# Patient Record
Sex: FEMALE | Race: OTHER
[De-identification: names, ages, dates, MRNs, and addresses within clinical notes are randomized per-mention and may not be internally consistent; named-entity substitution may affect disease eponyms.]

---

## 2018-02-12 ENCOUNTER — HOSPITAL ENCOUNTER (EMERGENCY)
Dept: HOSPITAL 74 - JER | Age: 38
Discharge: HOME | End: 2018-02-12
Payer: COMMERCIAL

## 2018-02-12 VITALS — HEART RATE: 72 BPM | SYSTOLIC BLOOD PRESSURE: 131 MMHG | TEMPERATURE: 98.4 F | DIASTOLIC BLOOD PRESSURE: 90 MMHG

## 2018-02-12 VITALS — BODY MASS INDEX: 31.1 KG/M2

## 2018-02-12 DIAGNOSIS — J11.1: Primary | ICD-10-CM

## 2018-02-12 NOTE — PDOC
History of Present Illness





- General


Chief Complaint: Sore Throat


Stated Complaint: SORE THROAT


Time Seen by Provider: 02/12/18 09:31


History Source: Patient


Exam Limitations: No Limitations





- History of Present Illness


Initial Comments: 





02/12/18 09:36


for evaluation of cough, fevers, sore throat pain, general body aches that 

started Friday night. States worsened through the weekend with fevers to 1 dose 

to that included moist cough with some nausea. This morning woke up and had no 

fever but mild nausea and sore throat pain persists.


Timing/Duration: unsure


Severity: mild, moderate


Associated Symptoms: reports: denies symptoms, cough.  denies: fever/chills





Past History





- Travel


Traveled outside of the country in the last 30 days: No


Close contact w/someone who was outside of country & ill: No





- Past Medical History


Allergies/Adverse Reactions: 


 Allergies











Allergy/AdvReac Type Severity Reaction Status Date / Time


 


No Known Allergies Allergy   Verified 02/12/18 09:14











Home Medications: 


Ambulatory Orders





NK [No Known Home Medication]  02/12/18 








Asthma: No


Cancer: No


Cardiac Disorders: No


COPD: No


Diabetes: No


HTN: No


Seizures: No


Thyroid Disease: No


Other medical history: DENIES MEDICAL HX





- Suicide/Smoking/Psychosocial Hx


Smoking History: Never smoked


Have you smoked in the past 12 months: No


Hx Alcohol Use: No


Drug/Substance Use Hx: No


Hx Substance Use Treatment: No





**Review of Systems





- Review of Systems


Able to Perform ROS?: Yes


Is the patient limited English proficient: Yes


Constitutional: Yes: Symptoms Reported, See HPI, Fever, Malaise


HEENTM: Yes: Symptoms Reported, See HPI, Nose Congestion, Throat Pain, 

Difficulty Swallowing


Respiratory: Yes: Symptoms reported, See HPI, Cough


Cardiac (ROS): No: Symptoms Reported


ABD/GI: Yes: Symptoms Reported, See HPI, Nausea.  No: Vomiting


Integumentary: Yes: Symptoms Reported, See HPI


All Other Systems: Reviewed and Negative





*Physical Exam





- Vital Signs


 Last Vital Signs











Temp Pulse Resp BP Pulse Ox


 


 98.4 F   72   16   131/90   97 


 


 02/12/18 09:12  02/12/18 09:12  02/12/18 09:12  02/12/18 09:12  02/12/18 09:12














- Physical Exam


Comments: 





02/12/18 09:37





GENERAL: [The child is awake, alert, and appropriately interactive.]


EYES: [The pupils are equal, round, and reactive to light, with clear, 

conjunctiva.but glassy]


NOSE: [The nose with clear drainage


EARS: [The ear canals and tympanic membranes are congested but landmarks easily 

visualed ]


THROAT: [The oropharynx is clear with erythema, no exudates. The mucous 

membranes are moist.]


NECK: [The neck is supple with mildly tender adenopathy, no menigemous]


CHEST: [The lungs are coarse but clear without crackles, or wheezes.]


HEART: [Heart is regular rhythm, with normal S1 and S2, no murmurs.]


ABDOMEN: [The abdomen is soft and nontender with normal bowel sounds. There is 

no organomegaly and no mass. There is no guarding or rebound.]


EXTREMITIES: [Extremities are normal.]


NEURO: [Behavior is normal for age.cranky but easily,m  Tone is normal.]


SKIN: [Skin is unremarkable without rash or swelling. There is no bruising, and 

there are no other signs of injury.]


General Appearance: Yes: Nourished, Appropriately Dressed, Apparent Distress, 

Mild Distress


HEENT: positive: Catherine SUAREZ Normal





Medical Decision Making





- Medical Decision Making





02/12/18 09:38


Upper respiratory infection, probable influenza however outside window for 

Tamiflu use. We'll continue conservative treatment





*DC/Admit/Observation/Transfer


Diagnosis at time of Disposition: 


 Influenzal acute upper respiratory infection








- Discharge Dispostion


Disposition: HOME


Condition at time of disposition: Stable


Admit: No





- Referrals





- Patient Instructions


Printed Discharge Instructions:  DI for Viral Upper Respiratory Infection -- 

Adult


Additional Instructions: 


Rest, drink lots of fluids: Teas, water, soups, Pedialyte


Saltwater gargles


Steamy showers/seem to face break up mucus


Old-fashioned treatments help!


Avoid contact with others until fevers and cough resolved as this is very 

contagious


Lots of handwashing and good hygiene





Continue over-the-counter medications for symptomatic relief


Tylenol or Motrin for fever and pain





Followup with private physician in one to 2 days as needed or if worsening


Return to emergency department for worsened symptoms, fevers, dehydration


Influenza takes between 5 and 7 days for resolution


To not participate in any activity, work, or school until fevers and cough are 

gone for at least one day





- Post Discharge Activity

## 2023-07-27 ENCOUNTER — HOSPITAL ENCOUNTER (OUTPATIENT)
Dept: HOSPITAL 74 - FMAMMOTONE | Age: 43
Discharge: HOME | End: 2023-07-27
Attending: NURSE PRACTITIONER
Payer: COMMERCIAL

## 2023-07-27 DIAGNOSIS — N61.21: Primary | ICD-10-CM

## 2023-07-27 DIAGNOSIS — R92.0: ICD-10-CM

## 2023-07-27 PROCEDURE — 0HBT3ZX EXCISION OF RIGHT BREAST, PERCUTANEOUS APPROACH, DIAGNOSTIC: ICD-10-PCS

## 2023-07-27 PROCEDURE — A4648 IMPLANTABLE TISSUE MARKER: HCPCS
